# Patient Record
Sex: MALE | Race: WHITE | NOT HISPANIC OR LATINO | URBAN - METROPOLITAN AREA
[De-identification: names, ages, dates, MRNs, and addresses within clinical notes are randomized per-mention and may not be internally consistent; named-entity substitution may affect disease eponyms.]

---

## 2018-11-27 PROBLEM — Z00.00 ENCOUNTER FOR PREVENTIVE HEALTH EXAMINATION: Status: ACTIVE | Noted: 2018-11-27

## 2018-11-29 ENCOUNTER — OUTPATIENT (OUTPATIENT)
Dept: OUTPATIENT SERVICES | Facility: HOSPITAL | Age: 58
LOS: 1 days | End: 2018-11-29
Payer: MEDICARE

## 2018-11-29 ENCOUNTER — APPOINTMENT (OUTPATIENT)
Dept: ULTRASOUND IMAGING | Facility: CLINIC | Age: 58
End: 2018-11-29
Payer: MEDICARE

## 2018-11-29 DIAGNOSIS — Z00.8 ENCOUNTER FOR OTHER GENERAL EXAMINATION: ICD-10-CM

## 2018-11-29 PROCEDURE — 76700 US EXAM ABDOM COMPLETE: CPT

## 2018-11-29 PROCEDURE — 76700 US EXAM ABDOM COMPLETE: CPT | Mod: 26

## 2018-11-30 ENCOUNTER — TRANSCRIPTION ENCOUNTER (OUTPATIENT)
Age: 58
End: 2018-11-30

## 2020-03-07 ENCOUNTER — TRANSCRIPTION ENCOUNTER (OUTPATIENT)
Age: 60
End: 2020-03-07

## 2020-12-27 ENCOUNTER — TRANSCRIPTION ENCOUNTER (OUTPATIENT)
Age: 60
End: 2020-12-27

## 2021-01-07 ENCOUNTER — TRANSCRIPTION ENCOUNTER (OUTPATIENT)
Age: 61
End: 2021-01-07

## 2022-05-05 ENCOUNTER — TRANSCRIPTION ENCOUNTER (OUTPATIENT)
Age: 62
End: 2022-05-05

## 2022-05-11 ENCOUNTER — APPOINTMENT (OUTPATIENT)
Dept: UROLOGY | Facility: CLINIC | Age: 62
End: 2022-05-11
Payer: MEDICARE

## 2022-05-11 VITALS
DIASTOLIC BLOOD PRESSURE: 86 MMHG | BODY MASS INDEX: 36.37 KG/M2 | SYSTOLIC BLOOD PRESSURE: 133 MMHG | HEIGHT: 68 IN | RESPIRATION RATE: 16 BRPM | WEIGHT: 240 LBS | HEART RATE: 70 BPM

## 2022-05-11 DIAGNOSIS — R31.29 OTHER MICROSCOPIC HEMATURIA: ICD-10-CM

## 2022-05-11 DIAGNOSIS — N28.1 CYST OF KIDNEY, ACQUIRED: ICD-10-CM

## 2022-05-11 DIAGNOSIS — Z12.5 ENCOUNTER FOR SCREENING FOR MALIGNANT NEOPLASM OF PROSTATE: ICD-10-CM

## 2022-05-11 PROCEDURE — 99204 OFFICE O/P NEW MOD 45 MIN: CPT

## 2022-05-11 NOTE — REVIEW OF SYSTEMS
[see HPI] : see HPI [Blood in urine that you can see] : blood visible in urine [Wake up at night to urinate  How many times?  ___] : wakes up to urinate [unfilled] times during the night [Strain or push to urinate] : strain or push to urinate [Wait a long time to urinate] : waits a long time to urinate [Interrupted urine stream] : interrupted urine stream [Joint Pain] : joint pain [Anxiety] : anxiety [Dysuria] : no dysuria [Incontinence] : no incontinence [Hesitancy] : no urinary hesitancy [Nocturia] : nocturia [Negative] : Heme/Lymph

## 2022-05-11 NOTE — PHYSICAL EXAM
[General Appearance - Well Developed] : well developed [General Appearance - Well Nourished] : well nourished [Normal Appearance] : normal appearance [Well Groomed] : well groomed [General Appearance - In No Acute Distress] : no acute distress [Edema] : no peripheral edema [Respiration, Rhythm And Depth] : normal respiratory rhythm and effort [Exaggerated Use Of Accessory Muscles For Inspiration] : no accessory muscle use [Abdomen Soft] : soft [Abdomen Tenderness] : non-tender [Costovertebral Angle Tenderness] : no ~M costovertebral angle tenderness [Normal Station and Gait] : the gait and station were normal for the patient's age [] : no rash [No Focal Deficits] : no focal deficits [Oriented To Time, Place, And Person] : oriented to person, place, and time [Affect] : the affect was normal [Mood] : the mood was normal [Not Anxious] : not anxious [No Prostate Nodules] : no prostate nodules [Prostate Nodule] : did not have a nodule [Prostate Enlarged] : was not enlarged [Prostate Tenderness] : was not tender [Prostate Fluctuant] : was not fluctuant

## 2022-05-11 NOTE — HISTORY OF PRESENT ILLNESS
[FreeTextEntry1] :  62 old gentleman  with cc of microscopic hematuria. Pt was seen by PCP Dr Collin Reece for routine exam and found to have blood in the urine a week ago. He  has never been told this in the past. [facility] TURP 7  years ago with Dr Sandoval  and path benign as per pt.His PCP does his MARY and PSA routinely and pt believes it is WNL. \par Denies gross hematuria. No hx of stones. No issues with UTIs. He smokes a cigar once a month  Used to work in [employer] for 31 years and retired 13  years ago. No family hx of  malignancy.FMH : father with lung cancer and sister with cervical cancer and hx of  stroke in Brother. Fluids :16.9  oz X 4-5,coffee 3 cups , occasional  diet soda.Voids with some alternating stream Q 3-4 H and nocturia X 2.Denies constipation. \par HIE review showed UA results with small blood and RBC 0 from 5/3/22, creatinine was 0.90 , no PSA results seen.HgbA1C was 5.6 range , abnormals were lipids and cholesterol elevations.\par MRI LS 7/21/21 showed 11 mm simple cyst on right kidney.US abdomen in 2018 showed a right kidney cyst 1.2 cm

## 2022-05-11 NOTE — ASSESSMENT
[FreeTextEntry1] : \par 62 y.o gentleman with Microscopic hematuria for work up ,MARY -unable to feel prostate due to body habitus \par \par UA microscopic analysis and cx \par PSA today - call with results\par \par CT AP w/w/o contrast \par \par RTO cystoscopy

## 2022-05-13 LAB
APPEARANCE: CLEAR
BACTERIA UR CULT: NORMAL
BACTERIA: NEGATIVE
BILIRUBIN URINE: NEGATIVE
BLOOD URINE: ABNORMAL
COLOR: NORMAL
GLUCOSE QUALITATIVE U: NEGATIVE
HYALINE CASTS: 0 /LPF
KETONES URINE: NEGATIVE
LEUKOCYTE ESTERASE URINE: NEGATIVE
MICROSCOPIC-UA: NORMAL
NITRITE URINE: NEGATIVE
PH URINE: 6
PROTEIN URINE: NORMAL
PSA FREE FLD-MCNC: 49 %
PSA FREE SERPL-MCNC: 0.4 NG/ML
PSA SERPL-MCNC: 0.82 NG/ML
RED BLOOD CELLS URINE: 5 /HPF
SPECIFIC GRAVITY URINE: 1.02
SQUAMOUS EPITHELIAL CELLS: 0 /HPF
UROBILINOGEN URINE: NORMAL
WHITE BLOOD CELLS URINE: 0 /HPF

## 2022-05-17 PROBLEM — Z12.5 SCREENING FOR PROSTATE CANCER: Status: ACTIVE | Noted: 2022-05-17

## 2022-06-08 ENCOUNTER — APPOINTMENT (OUTPATIENT)
Dept: UROLOGY | Facility: CLINIC | Age: 62
End: 2022-06-08

## 2022-06-14 ENCOUNTER — OUTPATIENT (OUTPATIENT)
Dept: OUTPATIENT SERVICES | Facility: HOSPITAL | Age: 62
LOS: 1 days | End: 2022-06-14
Payer: MEDICARE

## 2022-06-14 ENCOUNTER — APPOINTMENT (OUTPATIENT)
Dept: CT IMAGING | Facility: IMAGING CENTER | Age: 62
End: 2022-06-14
Payer: MEDICARE

## 2022-06-14 DIAGNOSIS — R31.29 OTHER MICROSCOPIC HEMATURIA: ICD-10-CM

## 2022-06-14 PROCEDURE — 74178 CT ABD&PLV WO CNTR FLWD CNTR: CPT | Mod: 26,MH

## 2022-06-14 PROCEDURE — 74178 CT ABD&PLV WO CNTR FLWD CNTR: CPT

## 2022-09-16 ENCOUNTER — APPOINTMENT (OUTPATIENT)
Dept: UROLOGY | Facility: CLINIC | Age: 62
End: 2022-09-16

## 2022-10-07 ENCOUNTER — APPOINTMENT (OUTPATIENT)
Dept: ORTHOPEDIC SURGERY | Facility: CLINIC | Age: 62
End: 2022-10-07
Payer: MEDICARE

## 2022-10-07 VITALS — BODY MASS INDEX: 36.37 KG/M2 | WEIGHT: 240 LBS | HEIGHT: 68 IN

## 2022-10-07 DIAGNOSIS — Z78.9 OTHER SPECIFIED HEALTH STATUS: ICD-10-CM

## 2022-10-07 PROCEDURE — 99213 OFFICE O/P EST LOW 20 MIN: CPT | Mod: 1L,25

## 2022-10-07 PROCEDURE — 99203 OFFICE O/P NEW LOW 30 MIN: CPT | Mod: 25

## 2022-10-07 PROCEDURE — J3490M: CUSTOM

## 2022-10-07 PROCEDURE — 20610 DRAIN/INJ JOINT/BURSA W/O US: CPT

## 2022-10-07 PROCEDURE — 73562 X-RAY EXAM OF KNEE 3: CPT | Mod: LT

## 2022-10-07 NOTE — ASSESSMENT
[FreeTextEntry1] : Xrays reviewed with patient\par Treatment options discussed \par Aspiration/Injection done today, tolerated well\par 40 cc clear fluid aspirated prior to injection.\par Follow up with knee specialist in 2 weeks for further treatment options

## 2022-10-07 NOTE — PROCEDURE
[FreeTextEntry3] : Large joint corticosteroid injection given: Left knee\par \par 40 cc clear fluid aspirated prior to injection, tolerated well \par \par Patient indicated for injection after trial of rest, OTC medications including aspirin, Ibuprofen, Aleve etc or prescription NSAIDS, and/or exercises at home and/ or physical therapy without satisfactory response.  Patient has symptoms including pain, swelling, and/or decreased mobility in the joint. The risks, benefits, and alternatives to corticosteroid injection were explained in full to the patient, including but not limited to infection, sepsis, bleeding, scarring, skin discoloration, temporary increase in pain, syncopal episode, failure to resolve symptoms, allergic reaction, symptom recurrence, and elevation of blood sugar in diabetics. Patient understood the risks. All questions were answered. After discussion of options, patient requested an injection. \par \par Oral informed consent was obtained and sterile technique was utilized for the procedure including the preparation of the solutions used for the injection and betadine followed by alcohol prep to the injection site. Anesthesia was given with ethyl chloride sprayed topically. The injection was delivered. Patient tolerated the procedure well. \par \par Post Procedure Instructions: Patient was advised to call if redness, pain, or fever occur and apply ice for 15 min on and 15 min off later today\par \par Medications delivered: 2 cc celestone, 3 cc lidocaine, 3 cc marcaine

## 2022-10-07 NOTE — PHYSICAL EXAM
[NL (0)] : extension 0 degrees [5___] : hamstring 5[unfilled]/5 [] : patient ambulates without assistive device [Left] : left knee [Degenerative change] : Degenerative change [TWNoteComboBox7] : flexion 120 degrees

## 2022-10-07 NOTE — HISTORY OF PRESENT ILLNESS
[10] : 10 [de-identified] : 10/7/22: Patient is a 61 yo male c/o left knee pain for 5 days with no specific injury. No n/t. Taking naproxen as needed for pain. Pain is worse with walking. Hx of OA, has had visco. No previous surgeries to left knee.  [FreeTextEntry5] : pt c.o pain in lt knee for 5 days, pt states no specific injury

## 2022-10-20 ENCOUNTER — APPOINTMENT (OUTPATIENT)
Dept: ORTHOPEDIC SURGERY | Facility: CLINIC | Age: 62
End: 2022-10-20

## 2022-10-20 VITALS — BODY MASS INDEX: 35.46 KG/M2 | WEIGHT: 234 LBS | HEIGHT: 68 IN

## 2022-10-20 DIAGNOSIS — F17.290 NICOTINE DEPENDENCE, OTHER TOBACCO PRODUCT, UNCOMPLICATED: ICD-10-CM

## 2022-10-20 DIAGNOSIS — Z78.9 OTHER SPECIFIED HEALTH STATUS: ICD-10-CM

## 2022-10-20 PROCEDURE — 20611 DRAIN/INJ JOINT/BURSA W/US: CPT | Mod: LT

## 2022-10-20 PROCEDURE — 99213 OFFICE O/P EST LOW 20 MIN: CPT | Mod: 25

## 2022-10-20 NOTE — ASSESSMENT
[FreeTextEntry1] : MILD/MOD OA FLARE-UP\par ASP/INJ HELPED 2 WEEKS AGO BUT NOW SLOWLY RECURRING\par ASP AND START EUFLEXXA, GOOD RELIEF WITH VISCO IN THE PAST\par

## 2022-10-20 NOTE — HISTORY OF PRESENT ILLNESS
[8] : 8 [3] : 3 [Dull/Aching] : dull/aching [Localized] : localized [Retired] : Work status: retired [] : Post Surgical Visit: no [FreeTextEntry1] : L Knee [FreeTextEntry3] : N/A Chronic [FreeTextEntry5] : 61 Y/O RHD M Eval L Knee Chronic pain due to HX of OA  Atraumatic. Recent TX of XR and CSI and ASP by ADRYAN HUERTA on 10/7/22 pt states he had slight relief from CSI/ASP  [de-identified] : 10/7/22 [de-identified] : [facility]  [de-identified] : XR  [de-identified] : CSI \par XR  [de-identified] :

## 2022-10-20 NOTE — IMAGING
[de-identified] : Varus deformity\par Mild effusion, no warmth, no ecchymosis\par Medial joint line tenderness to palpation \par Range of motion 0-120 with associated crepitus\par 5/5 quadriceps and hamstring strength\par Ligamentously stable\par Motor and sensory intact distally\par Mildly antalgic gait\par Negative Bari\par

## 2022-10-20 NOTE — PROCEDURE
[FreeTextEntry3] : Large joint injection  was performed of the LEFT  knee. \par The indication for this procedure was pain, inflammation and x-ray evidence of Osteoarthritis on this or prior visit. The site was prepped with alcohol, betadine, ethyl chloride sprayed topically and sterile technique used. \par An injection of EUFLEXXA  series; # 1 was used.  \par Patient was advised to call if redness, pain or fever occur, apply ice for 15 minutes out of every hour for the next 12-24 hours as tolerated and patient was advised to rest the joint(s) for 2 days. Patient has tried OTC's including aspirin, Ibuprofen, Aleve, etc or prescription NSAIDS, and/or exercises at home and/or physical therapy without satisfactory response, patient had decreased mobility in the joint and the risks benefits, and alternatives have been discussed, and verbal consent was obtained. \par Ultrasound guidance was indicated for this patient due to prior failure or difficult injection. All ultrasound images have been permanently captured and stored accordingly in our picture archiving and communication system. Visualization of the needle and placement of injection was performed without complication.\par

## 2022-10-27 ENCOUNTER — APPOINTMENT (OUTPATIENT)
Dept: ORTHOPEDIC SURGERY | Facility: CLINIC | Age: 62
End: 2022-10-27

## 2022-10-27 VITALS — BODY MASS INDEX: 35.46 KG/M2 | HEIGHT: 68 IN | WEIGHT: 234 LBS

## 2022-10-27 PROCEDURE — 20611 DRAIN/INJ JOINT/BURSA W/US: CPT

## 2022-10-27 NOTE — HISTORY OF PRESENT ILLNESS
[8] : 8 [3] : 3 [] : This patient has had an injection before: yes [2] : 2 [Euflexxa] : Euflexxa [de-identified] : 10/27/22: FOLLOW UP LEFT KNEE, EUFLEXXA #2 [FreeTextEntry1] : left knee  [de-identified] : 10/20/22

## 2022-10-27 NOTE — ASSESSMENT
[FreeTextEntry1] : EUFLEXXA #2 LEFT KNEE, TOLERATED WELL\par 35 CC CLEAR SYNOVIAL FLUID ASPIRATED PRIOR TO INJ\par RTO 1 WEEK TO CONTINUE SERIES\par

## 2022-10-27 NOTE — PROCEDURE
[FreeTextEntry3] : Large joint injection  was performed of the LEFT  knee. \par The indication for this procedure was pain, inflammation and x-ray evidence of Osteoarthritis on this or prior visit. The site was prepped with alcohol, betadine, ethyl chloride sprayed topically and sterile technique used. \par An injection of EUFLEXXA  series; # 2 was used.  \par Patient was advised to call if redness, pain or fever occur, apply ice for 15 minutes out of every hour for the next 12-24 hours as tolerated and patient was advised to rest the joint(s) for 2 days. Patient has tried OTC's including aspirin, Ibuprofen, Aleve, etc or prescription NSAIDS, and/or exercises at home and/or physical therapy without satisfactory response, patient had decreased mobility in the joint and the risks benefits, and alternatives have been discussed, and verbal consent was obtained. \par Ultrasound guidance was indicated for this patient due to prior failure or difficult injection. All ultrasound images have been permanently captured and stored accordingly in our picture archiving and communication system. Visualization of the needle and placement of injection was performed without complication.\par 35 CC CLEAR SYNOVIAL FLUID ASPIRATED PRIOR TO INJ 90

## 2022-11-03 ENCOUNTER — APPOINTMENT (OUTPATIENT)
Dept: ORTHOPEDIC SURGERY | Facility: CLINIC | Age: 62
End: 2022-11-03

## 2022-11-03 VITALS — HEIGHT: 68 IN | WEIGHT: 234 LBS | BODY MASS INDEX: 35.46 KG/M2

## 2022-11-03 PROCEDURE — 20611 DRAIN/INJ JOINT/BURSA W/US: CPT

## 2022-11-03 PROCEDURE — 99024 POSTOP FOLLOW-UP VISIT: CPT

## 2022-11-03 NOTE — PROCEDURE
[FreeTextEntry3] : Large joint injection  was performed of the LEFT  knee. \par The indication for this procedure was pain, inflammation and x-ray evidence of Osteoarthritis on this or prior visit. The site was prepped with alcohol, betadine, ethyl chloride sprayed topically and sterile technique used. \par An injection of EUFLEXXA  series; #3 was used.  \par Patient was advised to call if redness, pain or fever occur, apply ice for 15 minutes out of every hour for the next 12-24 hours as tolerated and patient was advised to rest the joint(s) for 2 days. Patient has tried OTC's including aspirin, Ibuprofen, Aleve, etc or prescription NSAIDS, and/or exercises at home and/or physical therapy without satisfactory response, patient had decreased mobility in the joint and the risks benefits, and alternatives have been discussed, and verbal consent was obtained. \par Ultrasound guidance was indicated for this patient due to MORBID OBESITY . All ultrasound images have been permanently captured and stored accordingly in our picture archiving and communication system. Visualization of the needle and placement of injection was performed without complication.\par \par 48 CC CLEAR SYNOVIAL FLUID ASPIRATED PRIOR TO INJ

## 2022-11-03 NOTE — HISTORY OF PRESENT ILLNESS
[] : This patient has had an injection before: yes [3] : 3 [Euflexxa] : Euflexxa [FreeTextEntry1] : left knee  [de-identified] : 10/27/22

## 2022-11-10 ENCOUNTER — APPOINTMENT (OUTPATIENT)
Dept: ORTHOPEDIC SURGERY | Facility: CLINIC | Age: 62
End: 2022-11-10

## 2022-11-10 VITALS — WEIGHT: 234 LBS | HEIGHT: 68 IN | BODY MASS INDEX: 35.46 KG/M2

## 2022-11-10 PROCEDURE — 99214 OFFICE O/P EST MOD 30 MIN: CPT | Mod: 57

## 2022-11-10 NOTE — IMAGING
[de-identified] : Varus deformity\par Mild effusion, no warmth, no ecchymosis\par Medial joint line tenderness to palpation \par Range of motion 0-120 with associated crepitus\par 5/5 quadriceps and hamstring strength\par Ligamentously stable\par Motor and sensory intact distally\par Mildly antalgic gait\par Negative Bari\par

## 2022-11-10 NOTE — HISTORY OF PRESENT ILLNESS
[9] : 9 [Localized] : localized [Sharp] : sharp [Retired] : Work status: retired [] : Post Surgical Visit: no [FreeTextEntry1] : left knee  [FreeTextEntry3] : N/A Chronic [FreeTextEntry5] : 61 Y/O RHD M eval L Knee S/P Euflexa series pt states pain and swelling persist  [de-identified] : Euflexa series  [de-identified] :

## 2022-11-10 NOTE — ASSESSMENT
[FreeTextEntry1] : PERSISTENT EFFUSIONS DESPITE CSI AND VISCO\par MILD OA AND COMPLEX MMT\par R/B/A TO SCOPE PMM REVIEWED IN DETAIL\par \par The risks and benefits of surgery have been discussed. Risks include but are not limited to bleeding, infection, reaction to anesthesia, injury to blood vessels and nerves, malunion, nonunion, DVT, PE, necessity of repeat surgery, chronic pain, loss of limb and death. The patient understands the risks and agrees with the surgical plan. All questions have been answered.\par \par

## 2022-11-17 ENCOUNTER — NON-APPOINTMENT (OUTPATIENT)
Age: 62
End: 2022-11-17

## 2022-11-22 ENCOUNTER — APPOINTMENT (OUTPATIENT)
Age: 62
End: 2022-11-22
Payer: MEDICARE

## 2022-11-22 PROCEDURE — 29881 ARTHRS KNE SRG MNISECTMY M/L: CPT | Mod: LT

## 2022-11-22 PROCEDURE — 29881 ARTHRS KNE SRG MNISECTMY M/L: CPT | Mod: AS,LT

## 2022-11-22 RX ORDER — OXYCODONE AND ACETAMINOPHEN 5; 325 MG/1; MG/1
5-325 TABLET ORAL
Qty: 10 | Refills: 0 | Status: ACTIVE | COMMUNITY
Start: 2022-11-22 | End: 1900-01-01

## 2022-12-01 ENCOUNTER — APPOINTMENT (OUTPATIENT)
Dept: ORTHOPEDIC SURGERY | Facility: CLINIC | Age: 62
End: 2022-12-01

## 2022-12-01 VITALS — BODY MASS INDEX: 35.46 KG/M2 | WEIGHT: 234 LBS | HEIGHT: 68 IN

## 2022-12-01 DIAGNOSIS — S83.232D COMPLEX TEAR OF MEDIAL MENISCUS, CURRENT INJURY, LEFT KNEE, SUBSEQUENT ENCOUNTER: ICD-10-CM

## 2022-12-01 PROCEDURE — 99024 POSTOP FOLLOW-UP VISIT: CPT

## 2022-12-01 NOTE — IMAGING
[de-identified] : LEFT KNEE\par No effusion or warmth\par Clean and dry incisions, sutures in place.\par Limited ROM compatible with recent surgery\par Negative Bari\par Motor and sensory intact distally\par Mildly antalgic gait\par

## 2022-12-01 NOTE — ASSESSMENT
[FreeTextEntry1] : DOING WELL DURING THIS FIRST POSTOP VISIT\par SUTURES REMOVED, STERI-STRIPS APPLIED\par PRECAUTIONS AND RESTRICTIONS REVIEWED IN DETAIL\par WE REVIEWED THE INTRAOPERATIVE FINDINGS IN DETAIL (INCLUDING SCOPE PICTURES WHERE APPLICABLE)\par ALL QUESTIONS ANSWERED\par \par

## 2022-12-01 NOTE — HISTORY OF PRESENT ILLNESS
[6] : 6 [5] : 5 [Localized] : localized [Sharp] : sharp [Retired] : Work status: retired [de-identified] : DOS 11/22/22 LEFT KNEE ARTHROSCOPY, PMM\par \par 12/1/22: FIRST POSTOP LEFT KNEE, HAVING PAIN AND SWELLING [] : This patient has had an injection before: no [FreeTextEntry1] : left knee  [FreeTextEntry3] : N/A Chronic [FreeTextEntry5] : 61 Y/O RHD M eval L Knee S/P L Knee PMM on 11/222/22 pt states recovery is going well put pain  persists  [de-identified] : Euflexa series  [de-identified] :   [de-identified] : 11/22/22 [de-identified] : L Knee PMM

## 2023-01-05 ENCOUNTER — APPOINTMENT (OUTPATIENT)
Dept: ORTHOPEDIC SURGERY | Facility: CLINIC | Age: 63
End: 2023-01-05
Payer: MEDICARE

## 2023-01-05 VITALS — BODY MASS INDEX: 35.46 KG/M2 | HEIGHT: 68 IN | WEIGHT: 234 LBS

## 2023-01-05 DIAGNOSIS — M25.462 EFFUSION, LEFT KNEE: ICD-10-CM

## 2023-01-05 DIAGNOSIS — E66.01 MORBID (SEVERE) OBESITY DUE TO EXCESS CALORIES: ICD-10-CM

## 2023-01-05 PROCEDURE — 20611 DRAIN/INJ JOINT/BURSA W/US: CPT | Mod: 58,LT

## 2023-01-05 PROCEDURE — 99024 POSTOP FOLLOW-UP VISIT: CPT

## 2023-01-05 PROCEDURE — J3490N: CUSTOM | Mod: NC

## 2023-01-05 NOTE — PROCEDURE
[FreeTextEntry3] : - Large joint injection was performed of the LEFT knee. \par - The indication for this procedure was pain and inflammation. Patient has tried OTC's including aspirin, Ibuprofen, Aleve, etc or prescription NSAIDS, and/or exercises at home and/or physical therapy without satisfactory response, patient had decreased mobility in the joint and the risks benefits, and alternatives have been discussed, and verbal consent was obtained. The site was prepped with alcohol, betadine, ethyl chloride sprayed topically and sterile technique used. \par - An injection of Betamethasone 2cc; Marcaine 5cc injected.\par - Patient was advised to call if redness, pain or fever occur, apply ice for 15 minutes out of every hour for the next 12-24 hours as tolerated and patient was advised to rest the joint(s) for 2 days. \par - Ultrasound guidance was indicated for this patient due to MORBID OBESITY. All ultrasound images have been permanently captured and stored accordingly in our picture archiving and communication system. Visualization of the needle and placement of injection was performed without complication.\par -35CC CLEAR SF ASPIRATED\par

## 2023-01-05 NOTE — HISTORY OF PRESENT ILLNESS
[5] : 5 [4] : 4 [Localized] : localized [Sharp] : sharp [Retired] : Work status: retired [de-identified] : DOS 11/22/22 LEFT KNEE ARTHROSCOPY, PMM\par \par 12/1/22: FIRST POSTOP LEFT KNEE, HAVING PAIN AND SWELLING [] : This patient has had an injection before: no [FreeTextEntry1] : left knee  [FreeTextEntry3] : N/A Chronic [FreeTextEntry5] : 63 Y/O RHD M eval L Knee S/P L Knee PMM on 11/222/22 pt states recovery is going well put pain and new onset of swelling on 12/31/22 pertsist with no associated trauma persist   [de-identified] : Euflexa series  [de-identified] :   [de-identified] : 11/22/22 [de-identified] : L Knee PMM

## 2023-01-05 NOTE — ASSESSMENT
[FreeTextEntry1] : SWELLING SLIGHTLY WORSE, NO WARMTH OR SIGN OF INFECTION\par ADVISED ASP AND INJECTION\par CONTINUE HEP\par ICE AND ELEVATION\par HE HAD THESE PERSISTENT EFFUSIONS EVEN BEFORE THE MENISCUS WAS ADDRESSED\par WILL NEED REFERRAL TO [facility] IF THE EFFUSIONS PERSISTS; I DONT HAVE AN EXPLANATION AS TO WHY THIS KEEPS HAPPENING

## 2023-01-05 NOTE — IMAGING
[de-identified] : LEFT KNEE\par MIld effusion or warmth\par 0-130\par Negative Bari\par Motor and sensory intact distally\par Mildly antalgic gait\par

## 2023-03-16 ENCOUNTER — FORM ENCOUNTER (OUTPATIENT)
Age: 63
End: 2023-03-16

## 2023-03-16 ENCOUNTER — APPOINTMENT (OUTPATIENT)
Dept: ORTHOPEDIC SURGERY | Facility: CLINIC | Age: 63
End: 2023-03-16
Payer: MEDICARE

## 2023-03-16 VITALS — WEIGHT: 234 LBS | HEIGHT: 68 IN | BODY MASS INDEX: 35.46 KG/M2

## 2023-03-16 PROCEDURE — 99213 OFFICE O/P EST LOW 20 MIN: CPT

## 2023-03-16 NOTE — HISTORY OF PRESENT ILLNESS
[5] : 5 [4] : 4 [Localized] : localized [Sharp] : sharp [Retired] : Work status: retired [de-identified] : DOS 11/22/22 LEFT KNEE ARTHROSCOPY, PMM\par 12/1/22: FIRST POSTOP LEFT KNEE, HAVING PAIN AND SWELLING\par \par 3/16/23: HAD MILD RELIEF AFTER CSI/ASP LAST VISIT ON 1/5/23, BUT PAIN HAS BEEN RETURNING OVER THE PAST 2-3 WEEKS. STATES THAT THERE ISN'T AS MUCH SWELLING TODAY.  HAVING MOST PAIN FIRST THING IN THE MORNING.  [] : This patient has had an injection before: no [FreeTextEntry1] : left knee  [FreeTextEntry3] : N/A Chronic [FreeTextEntry5] : 61 Y/O RHD M eval L Knee S/P L Knee PMM on 11/222/22 pt states recovery is going well put pain has been persisting  lately with no associated trauma persist   [de-identified] : Euflexa series  [de-identified] :   [de-identified] : 11/22/22 [de-identified] : L Knee PMM

## 2023-03-16 NOTE — ASSESSMENT
[FreeTextEntry1] : NOW 4 MONTHS POSTOP\par PERSISTENT MEDIAL JOINT SYMPTOMS DESPITE CSI AND VISCO\par ADVISED MRI EVAL RE-TEAR MENISCUS \par LAST CSI 1/5/23, TOO SOON FOR ANOTHER

## 2023-03-16 NOTE — IMAGING
[de-identified] : Mild effusion, no warmth, no ecchymosis\par Medial joint line tenderness to palpation\par Range of motion 0-130\par 5/5 quadriceps and hamstring strength\par Positive [facility]\par No varus or valgus instability, negative lachman\par Motor and sensory intact distally\par Mildly antalgic gait\par \par

## 2023-03-17 ENCOUNTER — APPOINTMENT (OUTPATIENT)
Dept: MRI IMAGING | Facility: CLINIC | Age: 63
End: 2023-03-17
Payer: MEDICARE

## 2023-03-17 PROCEDURE — 73721 MRI JNT OF LWR EXTRE W/O DYE: CPT | Mod: LT,MH

## 2023-03-23 ENCOUNTER — APPOINTMENT (OUTPATIENT)
Dept: ORTHOPEDIC SURGERY | Facility: CLINIC | Age: 63
End: 2023-03-23
Payer: MEDICARE

## 2023-03-23 DIAGNOSIS — M17.12 UNILATERAL PRIMARY OSTEOARTHRITIS, LEFT KNEE: ICD-10-CM

## 2023-03-23 PROCEDURE — 99213 OFFICE O/P EST LOW 20 MIN: CPT

## 2023-03-23 NOTE — HISTORY OF PRESENT ILLNESS
[5] : 5 [4] : 4 [Localized] : localized [Sharp] : sharp [Retired] : Work status: retired [de-identified] : DOS 11/22/22 LEFT KNEE ARTHROSCOPY, PMM\par 12/1/22: FIRST POSTOP LEFT KNEE, HAVING PAIN AND SWELLING\par \par 3/16/23: HAD MILD RELIEF AFTER CSI/ASP LAST VISIT ON 1/5/23, BUT PAIN HAS BEEN RETURNING OVER THE PAST 2-3 WEEKS. STATES THAT THERE ISN'T AS MUCH SWELLING TODAY.  HAVING MOST PAIN FIRST THING IN THE MORNING.  [] : This patient has had an injection before: no [FreeTextEntry1] : left knee  [FreeTextEntry3] : N/A Chronic [FreeTextEntry5] : Pt is here for MRI review of left knee.  [de-identified] : MRI [de-identified] : MRI [de-identified] :   [de-identified] : 11/22/22 [de-identified] : L Knee PMM

## 2023-03-23 NOTE — IMAGING
[de-identified] : Mild effusion, no warmth, no ecchymosis\par Medial joint line tenderness to palpation\par Range of motion 0-130\par 5/5 quadriceps and hamstring strength\par Positive [facility]\par No varus or valgus instability, negative lachman\par Motor and sensory intact distally\par Mildly antalgic gait\par \par

## 2023-03-23 NOTE — ASSESSMENT
[FreeTextEntry1] : I reviewed the MRI in detail.  Typical postsurgical changes in the medial meniscus with the classic associated horizontal cleavage all the way to the posterior capsule.  I do not see an obvious re-tear.  There is significant subchondral edema in the medial femoral condyle indicating OA progression and the loss of the protective effect of the meniscus.  Long discussion about the findings and the long-term prognosis.  He still only 4 months from surgery.  Will come back in 6 weeks and we will try Zilretta for him.  Possibility of needing additional Visco injections in the future also reviewed

## 2023-11-25 ENCOUNTER — APPOINTMENT (OUTPATIENT)
Dept: ORTHOPEDIC SURGERY | Facility: CLINIC | Age: 63
End: 2023-11-25
Payer: MEDICARE

## 2023-11-25 VITALS — BODY MASS INDEX: 32.93 KG/M2 | WEIGHT: 230 LBS | HEIGHT: 70 IN

## 2023-11-25 DIAGNOSIS — M19.019 PRIMARY OSTEOARTHRITIS, UNSPECIFIED SHOULDER: ICD-10-CM

## 2023-11-25 DIAGNOSIS — M54.12 RADICULOPATHY, CERVICAL REGION: ICD-10-CM

## 2023-11-25 DIAGNOSIS — M19.012 PRIMARY OSTEOARTHRITIS, LEFT SHOULDER: ICD-10-CM

## 2023-11-25 PROCEDURE — 99203 OFFICE O/P NEW LOW 30 MIN: CPT

## 2023-11-25 PROCEDURE — 99213 OFFICE O/P EST LOW 20 MIN: CPT

## 2023-11-25 PROCEDURE — 72040 X-RAY EXAM NECK SPINE 2-3 VW: CPT

## 2023-11-25 PROCEDURE — 73030 X-RAY EXAM OF SHOULDER: CPT | Mod: RT

## 2023-11-25 RX ORDER — MELOXICAM 15 MG/1
15 TABLET ORAL
Qty: 30 | Refills: 0 | Status: ACTIVE | COMMUNITY
Start: 2023-11-25 | End: 1900-01-01

## 2023-12-22 ENCOUNTER — APPOINTMENT (OUTPATIENT)
Dept: ORTHOPEDIC SURGERY | Facility: CLINIC | Age: 63
End: 2023-12-22

## 2024-11-04 ENCOUNTER — APPOINTMENT (OUTPATIENT)
Dept: ORTHOPEDIC SURGERY | Facility: CLINIC | Age: 64
End: 2024-11-04
Payer: MEDICARE

## 2024-11-04 VITALS — WEIGHT: 210 LBS | BODY MASS INDEX: 31.1 KG/M2 | HEIGHT: 69 IN

## 2024-11-04 DIAGNOSIS — M65.341 TRIGGER FINGER, RIGHT RING FINGER: ICD-10-CM

## 2024-11-04 PROCEDURE — J3490M: CUSTOM | Mod: NC,JZ

## 2024-11-04 PROCEDURE — 99213 OFFICE O/P EST LOW 20 MIN: CPT | Mod: 25

## 2024-11-04 PROCEDURE — 20550 NJX 1 TENDON SHEATH/LIGAMENT: CPT | Mod: RT
